# Patient Record
Sex: MALE | Race: BLACK OR AFRICAN AMERICAN | URBAN - METROPOLITAN AREA
[De-identification: names, ages, dates, MRNs, and addresses within clinical notes are randomized per-mention and may not be internally consistent; named-entity substitution may affect disease eponyms.]

---

## 2019-04-30 ENCOUNTER — EMERGENCY (EMERGENCY)
Facility: HOSPITAL | Age: 49
LOS: 0 days | Discharge: HOME | End: 2019-05-01
Attending: EMERGENCY MEDICINE
Payer: COMMERCIAL

## 2019-04-30 VITALS
WEIGHT: 213.41 LBS | HEART RATE: 66 BPM | TEMPERATURE: 97 F | SYSTOLIC BLOOD PRESSURE: 142 MMHG | DIASTOLIC BLOOD PRESSURE: 93 MMHG | RESPIRATION RATE: 18 BRPM | OXYGEN SATURATION: 100 %

## 2019-04-30 DIAGNOSIS — I10 ESSENTIAL (PRIMARY) HYPERTENSION: ICD-10-CM

## 2019-04-30 DIAGNOSIS — R07.9 CHEST PAIN, UNSPECIFIED: ICD-10-CM

## 2019-04-30 DIAGNOSIS — R07.89 OTHER CHEST PAIN: ICD-10-CM

## 2019-04-30 LAB
ALBUMIN SERPL ELPH-MCNC: 5 G/DL — SIGNIFICANT CHANGE UP (ref 3.5–5.2)
ALP SERPL-CCNC: 106 U/L — SIGNIFICANT CHANGE UP (ref 30–115)
ALT FLD-CCNC: 14 U/L — SIGNIFICANT CHANGE UP (ref 0–41)
ANION GAP SERPL CALC-SCNC: 13 MMOL/L — SIGNIFICANT CHANGE UP (ref 7–14)
APTT BLD: 34 SEC — SIGNIFICANT CHANGE UP (ref 27–39.2)
AST SERPL-CCNC: 21 U/L — SIGNIFICANT CHANGE UP (ref 0–41)
BASOPHILS # BLD AUTO: 0.07 K/UL — SIGNIFICANT CHANGE UP (ref 0–0.2)
BASOPHILS NFR BLD AUTO: 1.1 % — HIGH (ref 0–1)
BILIRUB SERPL-MCNC: 0.6 MG/DL — SIGNIFICANT CHANGE UP (ref 0.2–1.2)
BUN SERPL-MCNC: 14 MG/DL — SIGNIFICANT CHANGE UP (ref 10–20)
CALCIUM SERPL-MCNC: 10.2 MG/DL — HIGH (ref 8.5–10.1)
CHLORIDE SERPL-SCNC: 99 MMOL/L — SIGNIFICANT CHANGE UP (ref 98–110)
CO2 SERPL-SCNC: 26 MMOL/L — SIGNIFICANT CHANGE UP (ref 17–32)
CREAT SERPL-MCNC: 1.2 MG/DL — SIGNIFICANT CHANGE UP (ref 0.7–1.5)
EOSINOPHIL # BLD AUTO: 0.37 K/UL — SIGNIFICANT CHANGE UP (ref 0–0.7)
EOSINOPHIL NFR BLD AUTO: 5.8 % — SIGNIFICANT CHANGE UP (ref 0–8)
GLUCOSE SERPL-MCNC: 91 MG/DL — SIGNIFICANT CHANGE UP (ref 70–99)
HCT VFR BLD CALC: 41.9 % — LOW (ref 42–52)
HGB BLD-MCNC: 14.3 G/DL — SIGNIFICANT CHANGE UP (ref 14–18)
IMM GRANULOCYTES NFR BLD AUTO: 0.2 % — SIGNIFICANT CHANGE UP (ref 0.1–0.3)
INR BLD: 1.08 RATIO — SIGNIFICANT CHANGE UP (ref 0.65–1.3)
LYMPHOCYTES # BLD AUTO: 3.14 K/UL — SIGNIFICANT CHANGE UP (ref 1.2–3.4)
LYMPHOCYTES # BLD AUTO: 48.9 % — SIGNIFICANT CHANGE UP (ref 20.5–51.1)
MCHC RBC-ENTMCNC: 27.7 PG — SIGNIFICANT CHANGE UP (ref 27–31)
MCHC RBC-ENTMCNC: 34.1 G/DL — SIGNIFICANT CHANGE UP (ref 32–37)
MCV RBC AUTO: 81.2 FL — SIGNIFICANT CHANGE UP (ref 80–94)
MONOCYTES # BLD AUTO: 0.53 K/UL — SIGNIFICANT CHANGE UP (ref 0.1–0.6)
MONOCYTES NFR BLD AUTO: 8.3 % — SIGNIFICANT CHANGE UP (ref 1.7–9.3)
NEUTROPHILS # BLD AUTO: 2.3 K/UL — SIGNIFICANT CHANGE UP (ref 1.4–6.5)
NEUTROPHILS NFR BLD AUTO: 35.7 % — LOW (ref 42.2–75.2)
NRBC # BLD: 0 /100 WBCS — SIGNIFICANT CHANGE UP (ref 0–0)
PLATELET # BLD AUTO: 221 K/UL — SIGNIFICANT CHANGE UP (ref 130–400)
POTASSIUM SERPL-MCNC: 4.3 MMOL/L — SIGNIFICANT CHANGE UP (ref 3.5–5)
POTASSIUM SERPL-SCNC: 4.3 MMOL/L — SIGNIFICANT CHANGE UP (ref 3.5–5)
PROT SERPL-MCNC: 8.2 G/DL — HIGH (ref 6–8)
PROTHROM AB SERPL-ACNC: 12.4 SEC — SIGNIFICANT CHANGE UP (ref 9.95–12.87)
RBC # BLD: 5.16 M/UL — SIGNIFICANT CHANGE UP (ref 4.7–6.1)
RBC # FLD: 12.9 % — SIGNIFICANT CHANGE UP (ref 11.5–14.5)
SODIUM SERPL-SCNC: 138 MMOL/L — SIGNIFICANT CHANGE UP (ref 135–146)
TROPONIN T SERPL-MCNC: <0.01 NG/ML — SIGNIFICANT CHANGE UP
WBC # BLD: 6.42 K/UL — SIGNIFICANT CHANGE UP (ref 4.8–10.8)
WBC # FLD AUTO: 6.42 K/UL — SIGNIFICANT CHANGE UP (ref 4.8–10.8)

## 2019-04-30 PROCEDURE — 99220: CPT

## 2019-04-30 PROCEDURE — 93010 ELECTROCARDIOGRAM REPORT: CPT

## 2019-04-30 PROCEDURE — 71045 X-RAY EXAM CHEST 1 VIEW: CPT | Mod: 26

## 2019-04-30 RX ORDER — FAMOTIDINE 10 MG/ML
20 INJECTION INTRAVENOUS DAILY
Qty: 0 | Refills: 0 | Status: DISCONTINUED | OUTPATIENT
Start: 2019-04-30 | End: 2019-05-01

## 2019-04-30 RX ORDER — ASPIRIN/CALCIUM CARB/MAGNESIUM 324 MG
325 TABLET ORAL ONCE
Qty: 0 | Refills: 0 | Status: COMPLETED | OUTPATIENT
Start: 2019-04-30 | End: 2019-04-30

## 2019-04-30 RX ADMIN — Medication 325 MILLIGRAM(S): at 20:45

## 2019-04-30 NOTE — ED CDU PROVIDER INITIAL DAY NOTE - OBJECTIVE STATEMENT
48 y.o male w/ hx of HTN presents to the ED for evaluation of chest pain since this evening.  Developed midsternal "heat" sensation, nonexertional, nonpleuritic, intermittent, mild severity.  No dyspnea, edema of lower extremities, orthopnea, hemoptysis, recent travel, abd pain, N/V/D. No further complaints at this time.  no fhx of cardiac disease.  non-smoker.  no recent cardiac workup.

## 2019-04-30 NOTE — ED PROVIDER NOTE - OBJECTIVE STATEMENT
49 y/o male with h/o HTN, not on meds, in ER with c/o chest discomfort which started earlier tonight.  Pt describes burning pain to chest, off and on, lasts a few minutes at a time.  no assoc sob.  no n/v.  no diaphoresis.   no cough.  no abd pain.  no ha/dizziness/loc.   no le pain/swelling.  no symptoms currently.   no h/o prior cardiac w/u.

## 2019-04-30 NOTE — ED CDU PROVIDER INITIAL DAY NOTE - NS ED ROS FT
Constitutional: See HPI.  Eyes: No visual changes, eye pain or discharge.   ENMT: No hearing changes, pain, discharge or infections.   Cardiac: + chest pain. No SOB or edema. No chest pain with exertion.  Respiratory: No cough or respiratory distress.   GI: No nausea, vomiting, diarrhea or abdominal pain.  : No dysuria, frequency or burning. No Discharge  MS: No myalgia, muscle weakness, joint pain or back pain.  Neuro: No headache or weakness.   Skin: No skin rash.  Except as documented in the HPI, all other systems are negative.

## 2019-04-30 NOTE — ED PROVIDER NOTE - CLINICAL SUMMARY MEDICAL DECISION MAKING FREE TEXT BOX
Pt in ER with c/o intermittent episodes of chest burning. no h/o cardiac w/u in past. no symptoms now. ekg, trop neg.  pt placed in edou for further cardiac w/u.

## 2019-05-01 VITALS
TEMPERATURE: 98 F | SYSTOLIC BLOOD PRESSURE: 142 MMHG | HEART RATE: 70 BPM | OXYGEN SATURATION: 100 % | RESPIRATION RATE: 18 BRPM | DIASTOLIC BLOOD PRESSURE: 90 MMHG

## 2019-05-01 LAB — TROPONIN T SERPL-MCNC: <0.01 NG/ML — SIGNIFICANT CHANGE UP

## 2019-05-01 PROCEDURE — 99217: CPT

## 2019-05-01 PROCEDURE — 75574 CT ANGIO HRT W/3D IMAGE: CPT | Mod: 26

## 2019-05-01 PROCEDURE — 93010 ELECTROCARDIOGRAM REPORT: CPT

## 2019-05-01 NOTE — ED CDU PROVIDER SUBSEQUENT DAY NOTE - PROGRESS NOTE DETAILS
pt seen bedside, NAD, No complaints, endorsed to me by TANK Monzon. Pt assessed bedside and will be going for CCTA. will continue to monitor, negative cardiac enzymes. The patient was endorsed to me this  morning, he appears well, denies any complaints, exam is unremarkable, waiting for CCTA scheduled for this morning.  Test results done so far were d/w the patient and his wife. CCTA negative, patient is eager to go home. Patient to be discharged from ED. Any available test results were discussed with patient and family. Verbal instructions given, including instructions to return to ED immediately for any new, worsening, or concerning symptoms. Patient endorsed understanding. Written discharge instructions additionally given, including follow-up plan.  Patient was given opportunity to ask questions.

## 2019-05-01 NOTE — ED ADULT NURSE REASSESSMENT NOTE - NS ED NURSE REASSESS COMMENT FT1
patient assessed, no complaints of chest pain at this time, vital signs obtained and stable as per PA, plan for discharge

## 2019-05-01 NOTE — ED ADULT NURSE REASSESSMENT NOTE - NS ED NURSE REASSESS COMMENT FT1
patient assessed, no complaints of chest pain at this time, cardiac monitoring maintained, vital signs stable, awaiting CT scan of heart, will continue to monitor.

## 2019-05-01 NOTE — ED CDU PROVIDER SUBSEQUENT DAY NOTE - ATTENDING CONTRIBUTION TO CARE
49 yo male place in EDOU for work up of atypical CP described as heat and cold in his chest.  No associated symptoms, no h/o tobacco or drug use, no known risk factors for PE.  ECG  is non-ischemic, troponin negative, CCTA was ordered.

## 2019-05-01 NOTE — ED CDU PROVIDER DISPOSITION NOTE - CLINICAL COURSE
The patient was placed in EDOU for work up of CP.  SYmptoms were atypical, work up included, labs, ECG, CRX and CCTA which were negative.  He had an uneventful EDOU stay and was d/c home in a stable condition, pain free at discharge.

## 2019-05-01 NOTE — ED CDU PROVIDER DISPOSITION NOTE - CARE PROVIDER_API CALL
Oswald Trinh (MD)  Cardiovascular Disease; Internal Medicine; Interventional Cardiology  [address] [place] [address]  Phone: [phone]  Fax: [phone]  Follow Up Time:

## 2020-07-14 ENCOUNTER — TRANSCRIPTION ENCOUNTER (OUTPATIENT)
Age: 50
End: 2020-07-14

## 2021-02-02 ENCOUNTER — TRANSCRIPTION ENCOUNTER (OUTPATIENT)
Age: 51
End: 2021-02-02

## 2023-05-23 PROBLEM — I10 ESSENTIAL (PRIMARY) HYPERTENSION: Chronic | Status: ACTIVE | Noted: 2019-04-30

## 2023-06-12 PROBLEM — Z00.00 ENCOUNTER FOR PREVENTIVE HEALTH EXAMINATION: Status: ACTIVE | Noted: 2023-06-12

## 2023-06-13 ENCOUNTER — APPOINTMENT (OUTPATIENT)
Dept: ORTHOPEDIC SURGERY | Facility: CLINIC | Age: 53
End: 2023-06-13
Payer: COMMERCIAL

## 2023-06-13 PROCEDURE — 99214 OFFICE O/P EST MOD 30 MIN: CPT | Mod: 25

## 2023-06-13 PROCEDURE — 29130 APPL FINGER SPLINT STATIC: CPT | Mod: F9

## 2023-06-13 PROCEDURE — 99204 OFFICE O/P NEW MOD 45 MIN: CPT | Mod: 25

## 2023-06-13 PROCEDURE — 73130 X-RAY EXAM OF HAND: CPT | Mod: RT

## 2023-06-20 NOTE — ASSESSMENT
[FreeTextEntry1] : The patient comes in with pain in his right hand. He is experiencing pain in his right thumb,index,middle and small finger. His right small finger droops.This has been going on for a month. He thinks he might have injured while he was gardening. \par \par Right hand: No gross deformities visualized besides small finger extensor lag at the DIP joint, full range of motion of all fingers except extensor lag of the small finger, nontender ovation anywhere along all of the fingers, no triggering, no locking, negative Tinel's, negative Gallo's, neurovascular intact\par \par x-ray shows extensor lag small finger DIP joint no other abnormalities\par \par \par The pathology was discussed with the patient at length including the use of diagrams drawn in the office.  We discussed that the recommended treatment is closed treatment with full time splinting for 6weeks.  During those 6 weeks the splint cannot come off- if the finger tip bends for even a fraction of a second the healing tissue tears and the 6 week period would start all over again.  The splint must remain clean and dry and needs to be well covered in the shower.  The patient verbalized understanding of the need for full time 24/7 splinting.  We also discussed the possibility of pin placement surgically to allow a splint to come on and off, although it must be worn except for hygiene to protect the pin and prevent bending of the pin.  We discussed that the only true indication for surgery is an incongruent joint.   We discussed that there were 4 possibilities: a perfect finger, a stiff finger, a bent finger or a dorsal bump over the tendon attachment site.  There could be combinations of stiff, bent and bumpy finger. We discussed that after 6 weeks of full time splinting I recommend 6 weeks of nighttime splinting.  The patient understands that splinting may lead to skin breakdown under the splint.  There is also a possibility of surgery in the future up to and including fusion.  The patient agrees to recommended plan of splinting.\par \par The patient's right small finger was placed in a splint today. For the pain he will take Tylenol as needed. The patient will follow up in 3 weeks.\par \par A splint was applied.  The importance of ice and elevation were discussed with the patient.  The risks were also discussed such as compartment syndrome and skin breakdown.  They were instructed to never put foreign objects down the splint.  Patients should call for increasing pain, worsening swelling, numbness, extremity discoloration, or any other concerns.\par

## 2023-07-05 ENCOUNTER — APPOINTMENT (OUTPATIENT)
Dept: ORTHOPEDIC SURGERY | Facility: CLINIC | Age: 53
End: 2023-07-05

## 2023-07-18 ENCOUNTER — APPOINTMENT (OUTPATIENT)
Dept: ORTHOPEDIC SURGERY | Facility: CLINIC | Age: 53
End: 2023-07-18
Payer: COMMERCIAL

## 2023-07-18 PROCEDURE — 29130 APPL FINGER SPLINT STATIC: CPT | Mod: F9

## 2023-07-18 PROCEDURE — 99214 OFFICE O/P EST MOD 30 MIN: CPT | Mod: 25

## 2023-07-19 NOTE — ASSESSMENT
[FreeTextEntry1] : The patient comes in for a follow up. He states he is doing well. He has not been wearing his splint at all times.  He states he felt pulsations in his right small finger. He states he takes it off because he does a lot of cooking and he needs to clean his hands. He also takes it off for bathing.  He says that he ran out of tape so been using scotch tape.\par \par Right hand: No gross deformities visualized besides small finger extensor lag at the DIP joint, full range of motion of all fingers except extensor lag of the small finger, nontender to palpation anywhere along all of the fingers, no triggering, no locking, negative Tinel's, negative Gallo's, neurovascular intact\par \par \par \par The pathology was discussed with the patient at length including the use of diagrams drawn in the office.  We discussed that the recommended treatment is closed treatment with full time splinting for 6weeks.  During those 6 weeks the splint cannot come off- if the finger tip bends for even a fraction of a second the healing tissue tears and the 6 week period would start all over again.  The splint must remain clean and dry and needs to be well covered in the shower.  The patient verbalized understanding of the need for full time 24/7 splinting.  We also discussed the possibility of pin placement surgically to allow a splint to come on and off, although it must be worn except for hygiene to protect the pin and prevent bending of the pin.  We discussed that the only true indication for surgery is an incongruent joint.   We discussed that there were 4 possibilities: a perfect finger, a stiff finger, a bent finger or a dorsal bump over the tendon attachment site.  There could be combinations of stiff, bent and bumpy finger. We discussed that after 6 weeks of full time splinting I recommend 6 weeks of nighttime splinting.  The patient understands that splinting may lead to skin breakdown under the splint.  There is also a possibility of surgery in the future up to and including fusion.  The patient agrees to recommended plan of splinting.\par \par The patient's right small finger was placed in a new splint today. It was explained to him how important splinting is to the healing process. If he needs to take off the splint, he was advised to put his hand on a flat surface or to use his finger on his other hand to keep his right small finger straight. He will follow up with [facility] in 2 weeks. After two weeks he will start night time splinting.\par \par A splint was applied.  The importance of ice and elevation were discussed with the patient.  The risks were also discussed such as compartment syndrome and skin breakdown.  They were instructed to never put foreign objects down the splint.  Patients should call for increasing pain, worsening swelling, numbness, extremity discoloration, or any other concerns.\par

## 2023-08-01 ENCOUNTER — APPOINTMENT (OUTPATIENT)
Dept: ORTHOPEDIC SURGERY | Facility: CLINIC | Age: 53
End: 2023-08-01
Payer: COMMERCIAL

## 2023-08-01 DIAGNOSIS — M20.011 MALLET FINGER OF RIGHT FINGER(S): ICD-10-CM

## 2023-08-01 PROCEDURE — 99213 OFFICE O/P EST LOW 20 MIN: CPT

## 2023-08-01 NOTE — ASSESSMENT
[FreeTextEntry1] : The patient comes in for a follow up. He states he is doing well. He has not been wearing his splint at all times.  He states he felt pulsations in his right small finger. He states he takes it off because he does a lot of cooking and he needs to clean his hands. He also takes it off for bathing.  He says that he ran out of tape so been using scotch tape.  Right hand: No gross deformities visualized besides small finger extensor lag at the DIP joint, full range of motion of all fingers except extensor lag of the small finger, nontender to palpation anywhere along all of the fingers, no triggering, no locking, negative Tinel's, negative Gallo's, neurovascular intact    The pathology was discussed with the patient at length including the use of diagrams drawn in the office.  We discussed that the recommended treatment is closed treatment with full time splinting for 6weeks.  During those 6 weeks the splint cannot come off- if the finger tip bends for even a fraction of a second the healing tissue tears and the 6 week period would start all over again.  The splint must remain clean and dry and needs to be well covered in the shower.  The patient verbalized understanding of the need for full time 24/7 splinting.  We also discussed the possibility of pin placement surgically to allow a splint to come on and off, although it must be worn except for hygiene to protect the pin and prevent bending of the pin.  We discussed that the only true indication for surgery is an incongruent joint.   We discussed that there were 4 possibilities: a perfect finger, a stiff finger, a bent finger or a dorsal bump over the tendon attachment site.  There could be combinations of stiff, bent and bumpy finger. We discussed that after 6 weeks of full time splinting I recommend 6 weeks of nighttime splinting.  The patient understands that splinting may lead to skin breakdown under the splint.  There is also a possibility of surgery in the future up to and including fusion.  The patient agrees to recommended plan of splinting.  The patient's right small finger was placed in a new splint today. It was explained to him how important splinting is to the healing process. If he needs to take off the splint, he was advised to put his hand on a flat surface or to use his finger on his other hand to keep his right small finger straight. He will follow up with [facility] in 2 weeks. After two weeks he will start night time splinting.  A splint was applied.  The importance of ice and elevation were discussed with the patient.  The risks were also discussed such as compartment syndrome and skin breakdown.  They were instructed to never put foreign objects down the splint.  Patients should call for increasing pain, worsening swelling, numbness, extremity discoloration, or any other concerns.  He will complete 6 weeks of nighttime splinting only and see me back in 6 weeks for repeat evaluation.

## 2024-06-01 ENCOUNTER — NON-APPOINTMENT (OUTPATIENT)
Age: 54
End: 2024-06-01

## 2024-06-02 ENCOUNTER — EMERGENCY (EMERGENCY)
Facility: HOSPITAL | Age: 54
LOS: 0 days | Discharge: HOME | End: 2024-06-02
Attending: EMERGENCY MEDICINE
Payer: COMMERCIAL

## 2024-06-02 VITALS
TEMPERATURE: 99 F | OXYGEN SATURATION: 98 % | RESPIRATION RATE: 19 BRPM | HEART RATE: 91 BPM | DIASTOLIC BLOOD PRESSURE: 80 MMHG | SYSTOLIC BLOOD PRESSURE: 151 MMHG

## 2024-06-02 DIAGNOSIS — R50.9 FEVER, UNSPECIFIED: ICD-10-CM

## 2024-06-02 DIAGNOSIS — I10 ESSENTIAL (PRIMARY) HYPERTENSION: ICD-10-CM

## 2024-06-02 DIAGNOSIS — R51.9 HEADACHE, UNSPECIFIED: ICD-10-CM

## 2024-06-02 PROCEDURE — 99282 EMERGENCY DEPT VISIT SF MDM: CPT

## 2024-06-02 PROCEDURE — 99283 EMERGENCY DEPT VISIT LOW MDM: CPT

## 2024-06-02 NOTE — ED PROVIDER NOTE - PATIENT PORTAL LINK FT
You can access the FollowMyHealth Patient Portal offered by [facility] by registering at the following website: [email or web address]. By joining [employer]’s FollowMyHealth portal, you will also be able to view your health information using other applications (apps) compatible with our system.

## 2024-06-02 NOTE — ED PROVIDER NOTE - OBJECTIVE STATEMENT
53-year-old male PMH HTN sent in by urgent care to rule out sepsis.  Patient reports general and malaise since Thursday, and states he had fever at home yesterday.  Patient went to urgent care today for symptoms, febrile to 103 at urgent care.  Chest x-ray, rapid COVID and flu A/B swabs done, all negative per urgent care discharge paperwork.  Patient received 600 mg of ibuprofen prior to discharge from urgent care.  Patient is afebrile in triage.Denies cough, congestion, chest pain, shortness of breath, dizziness, syncope, nausea, vomiting, abdominal pain, diarrhea, constipation, neck pain/stiffness, visual disturbances, or urinary symptoms.  Denies recent travel.  No known sick contacts.

## 2024-06-02 NOTE — ED PROVIDER NOTE - CLINICAL SUMMARY MEDICAL DECISION MAKING FREE TEXT BOX
53-year-old male with history of hypertension, no other past medical history, presents with fever since Thursday.  Has been taking Tylenol at home, and states has been drinking a lot of tea to keep hydrated.  States has also had some joint aches and weakness, though improved now after being given ibuprofen at urgent care.  Also had head pressure at 1 point but now absent.  Sent in from urgent care due to concern for sepsis given temperature to 103 and heart rate 103.  Tested they are negative for COVID, flu.  Patient denies all other symptoms including cough, URI symptoms, vomiting, diarrhea, abdominal pain, skin infection, urinary symptoms.  States sexually active with wife alone and no concern for STD.  Denies recent long distance travel as well as exposure to patients with international travel and sick contacts.  On exam, afebrile, hemodynamically stable, saturating well on room air, NAD, well appearing, sitting comfortably in bed, no WOB, speaking full sentences, head NCAT, neck supple/full ROM, EOMI grossly, anicteric, MMM, no JVD, RRR, nml S1/S2, no m/r/g, lungs CTAB, no w/r/r, abd soft, NT, ND, nml BS, no rebound or guarding, no CVAT, AAO, CN's 3-12 grossly intact, URIBE spontaneously, no leg cyanosis or edema, skin warm, well perfused, no rashes or hives.  No evidence of meningitis to warrant LP.  No evidence of endocarditis to warrant blood work.  No evidence of cellulitis.  No abdominal pain or tenderness to warrant CT imaging.  No urinary signs or symptoms to suggest UTI to warrant urinary testing.  Patient with no specific bacterial source to warrant antibiotics at this time.  Patient very well-appearing, appears well-hydrated, alert and energetic.  Offered labs and had shared decision-making conversation and will be discharged.  Given strict return precautions and instructions for symptomatic care.

## 2024-06-02 NOTE — ED PROVIDER NOTE - PHYSICAL EXAMINATION
VITAL SIGNS: I have reviewed nursing notes and confirm.  CONSTITUTIONAL: well-appearing, non-toxic, NAD  SKIN: Warm dry  HEAD: NCAT  EYES: EOMI, PERRLA, no scleral icterus  ENT: Moist mucous membranes  NECK: Supple; non tender. Full ROM. No cervical LAD  CARD: RRR, no murmurs, rubs or gallops  RESP: clear to ausculation b/l.  No rales, rhonchi, or wheezing.  ABD: soft, non-tender, non-distended  EXT: Full ROM, no bony tenderness, no pedal edema, no calf tenderness  NEURO: normal motor. normal sensory. Normal gait.  PSYCH: Cooperative, appropriate.

## 2024-06-08 ENCOUNTER — EMERGENCY (EMERGENCY)
Facility: HOSPITAL | Age: 54
LOS: 0 days | Discharge: HOME | End: 2024-06-08
Attending: STUDENT IN AN ORGANIZED HEALTH CARE EDUCATION/TRAINING PROGRAM
Payer: COMMERCIAL

## 2024-06-08 VITALS
TEMPERATURE: 99 F | OXYGEN SATURATION: 97 % | SYSTOLIC BLOOD PRESSURE: 130 MMHG | DIASTOLIC BLOOD PRESSURE: 83 MMHG | HEART RATE: 100 BPM | RESPIRATION RATE: 18 BRPM | WEIGHT: 199.08 LBS

## 2024-06-08 DIAGNOSIS — R50.9 FEVER, UNSPECIFIED: ICD-10-CM

## 2024-06-08 DIAGNOSIS — I10 ESSENTIAL (PRIMARY) HYPERTENSION: ICD-10-CM

## 2024-06-08 DIAGNOSIS — R61 GENERALIZED HYPERHIDROSIS: ICD-10-CM

## 2024-06-08 DIAGNOSIS — J18.9 PNEUMONIA, UNSPECIFIED ORGANISM: ICD-10-CM

## 2024-06-08 DIAGNOSIS — R51.9 HEADACHE, UNSPECIFIED: ICD-10-CM

## 2024-06-08 DIAGNOSIS — Z20.822 CONTACT WITH AND (SUSPECTED) EXPOSURE TO COVID-19: ICD-10-CM

## 2024-06-08 LAB
ALBUMIN SERPL ELPH-MCNC: 3.8 G/DL — SIGNIFICANT CHANGE UP (ref 3.5–5.2)
ALP SERPL-CCNC: 115 U/L — SIGNIFICANT CHANGE UP (ref 30–115)
ALT FLD-CCNC: 133 U/L — HIGH (ref 0–41)
ANION GAP SERPL CALC-SCNC: 14 MMOL/L — SIGNIFICANT CHANGE UP (ref 7–14)
APPEARANCE UR: CLEAR — SIGNIFICANT CHANGE UP
AST SERPL-CCNC: 111 U/L — HIGH (ref 0–41)
BASOPHILS # BLD AUTO: 0.02 K/UL — SIGNIFICANT CHANGE UP (ref 0–0.2)
BASOPHILS NFR BLD AUTO: 0.2 % — SIGNIFICANT CHANGE UP (ref 0–1)
BILIRUB SERPL-MCNC: 0.4 MG/DL — SIGNIFICANT CHANGE UP (ref 0.2–1.2)
BILIRUB UR-MCNC: NEGATIVE — SIGNIFICANT CHANGE UP
BUN SERPL-MCNC: 11 MG/DL — SIGNIFICANT CHANGE UP (ref 10–20)
CALCIUM SERPL-MCNC: 9.5 MG/DL — SIGNIFICANT CHANGE UP (ref 8.4–10.5)
CHLORIDE SERPL-SCNC: 100 MMOL/L — SIGNIFICANT CHANGE UP (ref 98–110)
CO2 SERPL-SCNC: 25 MMOL/L — SIGNIFICANT CHANGE UP (ref 17–32)
COLOR SPEC: YELLOW — SIGNIFICANT CHANGE UP
CREAT SERPL-MCNC: 0.9 MG/DL — SIGNIFICANT CHANGE UP (ref 0.7–1.5)
DIFF PNL FLD: NEGATIVE — SIGNIFICANT CHANGE UP
EGFR: 101 ML/MIN/1.73M2 — SIGNIFICANT CHANGE UP
EOSINOPHIL # BLD AUTO: 0.19 K/UL — SIGNIFICANT CHANGE UP (ref 0–0.7)
EOSINOPHIL NFR BLD AUTO: 2 % — SIGNIFICANT CHANGE UP (ref 0–8)
FLUAV AG NPH QL: SIGNIFICANT CHANGE UP
FLUBV AG NPH QL: SIGNIFICANT CHANGE UP
GLUCOSE SERPL-MCNC: 117 MG/DL — HIGH (ref 70–99)
GLUCOSE UR QL: NEGATIVE MG/DL — SIGNIFICANT CHANGE UP
HCT VFR BLD CALC: 33.9 % — LOW (ref 42–52)
HGB BLD-MCNC: 11.9 G/DL — LOW (ref 14–18)
IMM GRANULOCYTES NFR BLD AUTO: 1.4 % — HIGH (ref 0.1–0.3)
KETONES UR-MCNC: NEGATIVE MG/DL — SIGNIFICANT CHANGE UP
LACTATE SERPL-SCNC: 1.2 MMOL/L — SIGNIFICANT CHANGE UP (ref 0.7–2)
LEUKOCYTE ESTERASE UR-ACNC: NEGATIVE — SIGNIFICANT CHANGE UP
LYMPHOCYTES # BLD AUTO: 1.59 K/UL — SIGNIFICANT CHANGE UP (ref 1.2–3.4)
LYMPHOCYTES # BLD AUTO: 16.3 % — LOW (ref 20.5–51.1)
MCHC RBC-ENTMCNC: 27.3 PG — SIGNIFICANT CHANGE UP (ref 27–31)
MCHC RBC-ENTMCNC: 35.1 G/DL — SIGNIFICANT CHANGE UP (ref 32–37)
MCV RBC AUTO: 77.8 FL — LOW (ref 80–94)
MONOCYTES # BLD AUTO: 0.87 K/UL — HIGH (ref 0.1–0.6)
MONOCYTES NFR BLD AUTO: 8.9 % — SIGNIFICANT CHANGE UP (ref 1.7–9.3)
NEUTROPHILS # BLD AUTO: 6.92 K/UL — HIGH (ref 1.4–6.5)
NEUTROPHILS NFR BLD AUTO: 71.2 % — SIGNIFICANT CHANGE UP (ref 42.2–75.2)
NITRITE UR-MCNC: NEGATIVE — SIGNIFICANT CHANGE UP
NRBC # BLD: 0 /100 WBCS — SIGNIFICANT CHANGE UP (ref 0–0)
PH UR: 6.5 — SIGNIFICANT CHANGE UP (ref 5–8)
PLATELET # BLD AUTO: 353 K/UL — SIGNIFICANT CHANGE UP (ref 130–400)
PMV BLD: 12 FL — HIGH (ref 7.4–10.4)
POTASSIUM SERPL-MCNC: 3.8 MMOL/L — SIGNIFICANT CHANGE UP (ref 3.5–5)
POTASSIUM SERPL-SCNC: 3.8 MMOL/L — SIGNIFICANT CHANGE UP (ref 3.5–5)
PROT SERPL-MCNC: 7.8 G/DL — SIGNIFICANT CHANGE UP (ref 6–8)
PROT UR-MCNC: SIGNIFICANT CHANGE UP MG/DL
RBC # BLD: 4.36 M/UL — LOW (ref 4.7–6.1)
RBC # FLD: 14.4 % — SIGNIFICANT CHANGE UP (ref 11.5–14.5)
RSV RNA NPH QL NAA+NON-PROBE: SIGNIFICANT CHANGE UP
SARS-COV-2 RNA SPEC QL NAA+PROBE: SIGNIFICANT CHANGE UP
SODIUM SERPL-SCNC: 139 MMOL/L — SIGNIFICANT CHANGE UP (ref 135–146)
SP GR SPEC: 1.02 — SIGNIFICANT CHANGE UP (ref 1–1.03)
UROBILINOGEN FLD QL: 1 MG/DL — SIGNIFICANT CHANGE UP (ref 0.2–1)
WBC # BLD: 9.73 K/UL — SIGNIFICANT CHANGE UP (ref 4.8–10.8)
WBC # FLD AUTO: 9.73 K/UL — SIGNIFICANT CHANGE UP (ref 4.8–10.8)

## 2024-06-08 PROCEDURE — 99284 EMERGENCY DEPT VISIT MOD MDM: CPT

## 2024-06-08 PROCEDURE — 0241U: CPT

## 2024-06-08 PROCEDURE — 96374 THER/PROPH/DIAG INJ IV PUSH: CPT

## 2024-06-08 PROCEDURE — 81003 URINALYSIS AUTO W/O SCOPE: CPT

## 2024-06-08 PROCEDURE — 96375 TX/PRO/DX INJ NEW DRUG ADDON: CPT

## 2024-06-08 PROCEDURE — 71045 X-RAY EXAM CHEST 1 VIEW: CPT | Mod: 26,59

## 2024-06-08 PROCEDURE — 36415 COLL VENOUS BLD VENIPUNCTURE: CPT

## 2024-06-08 PROCEDURE — 83605 ASSAY OF LACTIC ACID: CPT

## 2024-06-08 PROCEDURE — 80053 COMPREHEN METABOLIC PANEL: CPT

## 2024-06-08 PROCEDURE — 85025 COMPLETE CBC W/AUTO DIFF WBC: CPT

## 2024-06-08 PROCEDURE — 71046 X-RAY EXAM CHEST 2 VIEWS: CPT

## 2024-06-08 PROCEDURE — 71045 X-RAY EXAM CHEST 1 VIEW: CPT

## 2024-06-08 PROCEDURE — 71046 X-RAY EXAM CHEST 2 VIEWS: CPT | Mod: 26

## 2024-06-08 PROCEDURE — 99284 EMERGENCY DEPT VISIT MOD MDM: CPT | Mod: 25

## 2024-06-08 RX ORDER — AZITHROMYCIN 500 MG/1
1 TABLET, FILM COATED ORAL
Qty: 1 | Refills: 0
Start: 2024-06-08

## 2024-06-08 RX ORDER — METOCLOPRAMIDE HCL 10 MG
10 TABLET ORAL ONCE
Refills: 0 | Status: COMPLETED | OUTPATIENT
Start: 2024-06-08 | End: 2024-06-08

## 2024-06-08 RX ORDER — SODIUM CHLORIDE 9 MG/ML
1000 INJECTION, SOLUTION INTRAVENOUS ONCE
Refills: 0 | Status: COMPLETED | OUTPATIENT
Start: 2024-06-08 | End: 2024-06-08

## 2024-06-08 RX ORDER — AZITHROMYCIN 500 MG/1
0 TABLET, FILM COATED ORAL
Qty: 1 | Refills: 0
Start: 2024-06-08

## 2024-06-08 RX ORDER — KETOROLAC TROMETHAMINE 30 MG/ML
15 SYRINGE (ML) INJECTION ONCE
Refills: 0 | Status: DISCONTINUED | OUTPATIENT
Start: 2024-06-08 | End: 2024-06-08

## 2024-06-08 RX ORDER — ACETAMINOPHEN 500 MG
975 TABLET ORAL ONCE
Refills: 0 | Status: COMPLETED | OUTPATIENT
Start: 2024-06-08 | End: 2024-06-08

## 2024-06-08 RX ORDER — AZITHROMYCIN 500 MG/1
1 TABLET, FILM COATED ORAL
Qty: 10 | Refills: 0
Start: 2024-06-08 | End: 2024-06-12

## 2024-06-08 RX ADMIN — Medication 10 MILLIGRAM(S): at 09:59

## 2024-06-08 RX ADMIN — Medication 975 MILLIGRAM(S): at 09:58

## 2024-06-08 RX ADMIN — SODIUM CHLORIDE 1000 MILLILITER(S): 9 INJECTION, SOLUTION INTRAVENOUS at 09:57

## 2024-06-08 RX ADMIN — Medication 15 MILLIGRAM(S): at 09:59

## 2024-06-08 NOTE — ED PROVIDER NOTE - PATIENT PORTAL LINK FT
You can access the FollowMyHealth Patient Portal offered by [facility] by registering at the following website: [email or web address]. By joining [employer]’s FollowMyHealth portal, you will also be able to view your health information using other applications (apps) compatible with our system.

## 2024-06-08 NOTE — ED ADULT NURSE NOTE - NSFALLUNIVINTERV_ED_ALL_ED
NEGATIVE
Bed/Stretcher in lowest position, wheels locked, appropriate side rails in place/Call bell, personal items and telephone in reach/Instruct patient to call for assistance before getting out of bed/chair/stretcher/Non-slip footwear applied when patient is off stretcher/[place] to call system/Physically safe environment - no spills, clutter or unnecessary equipment/Purposeful proactive rounding/Room/bathroom lighting operational, light cord in reach

## 2024-06-08 NOTE — ED PROVIDER NOTE - CLINICAL SUMMARY MEDICAL DECISION MAKING FREE TEXT BOX
.    53-year-old male, PMH HTN, p/w headache x 5 days.  Gradual onset, not worst of life, no neck pain or stiffness, or neurodeficit.  No trauma.  + Night sweats and subjective fevers.  + Fever which started about 10 days ago, Tmax 103.  Fevers improved.  Patient has had 2 outpatient negative x-rays, negative COVID and flu.  + Dry intermittent cough.  No vomiting, diarrhea, or urinary complaints.  No rash.  No IV drug use, no recent dental work.  No travel outside of the [place].  No TB contacts.  Patient has never had TB.    Patient is very well-appearing, NCAT, neck supple, OP clear, CTAB, normal neuroexam.    Additional information obtained from chart review.    Differential diagnosis includes but not limited to pneumonia, viral illness, UTI    All available lab tests, imaging tests, and EKGs independently reviewed and interpreted by [facility].  Chest x-ray + right basilar infiltrate.    IMP: Pneumonia.  Patient support discharge with broad-spectrum antibiotics, outpatient follow-up, and return precautions.  Patient understands signs symptoms for ED return.  DC home.      .

## 2024-06-08 NOTE — ED PROVIDER NOTE - OBJECTIVE STATEMENT
Patient is a 53 year old female with PMH of HTN presenting for a headache. Patient endorses a persistent headache described as a "shooting sensation" to the top of his scalp x5 days and associated with night sweats. Patient endorses a report of fevers (tmax 103) last week; had two chest xrays that were negative and a flu-swab that was also negative. Patient states he has not had a report of fevers or chills for the past four days but his headache has continued. Patient denies recent sick contacts, travels outside the country in the past six months, recent insect bites, rash, cough, congestion, dysuria, hematuria, nausea, vomiting, chest pain, shortness of breath, nuchal rigidity, confusion, calf pain/swelling, vision changes, or additional complaints.

## 2025-07-27 ENCOUNTER — NON-APPOINTMENT (OUTPATIENT)
Age: 55
End: 2025-07-27